# Patient Record
Sex: MALE | URBAN - METROPOLITAN AREA
[De-identification: names, ages, dates, MRNs, and addresses within clinical notes are randomized per-mention and may not be internally consistent; named-entity substitution may affect disease eponyms.]

---

## 2021-04-27 ENCOUNTER — HOME HEALTH ADMISSION (OUTPATIENT)
Dept: HOME HEALTH SERVICES | Facility: HOME HEALTH | Age: 56
End: 2021-04-27

## 2023-11-08 ENCOUNTER — HOSPITAL ENCOUNTER (OUTPATIENT)
Facility: HOSPITAL | Age: 58
Setting detail: RECURRING SERIES
Discharge: HOME | End: 2023-11-11
Payer: MEDICARE

## 2023-11-08 PROCEDURE — 97163 PT EVAL HIGH COMPLEX 45 MIN: CPT

## 2023-11-08 NOTE — PROGRESS NOTES
Is This A New Presentation, Or A Follow-Up?: Skin Lesion
PHYSICAL / OCCUPATIONAL THERAPY - DAILY TREATMENT NOTE (updated )  For Eval visit    Patient Name: Jessica Valencia    Date: 2023    : 1965  Insurance: Payor: Dori Marinelli / Plan: Javier Stacy / Product Type: *No Product type* /      Patient  verified yes     Visit #   Current / Total 1 36   Time   In / Out 11:00 11:40   Pain   In / Out 5 5   Subjective Functional Status/Changes: See POC     TREATMENT AREA =  Pain in right hip [M25.551]    OBJECTIVE           40 min   Eval - untimed                      Therapeutic Procedures: Tx Min Billable or 1:1 Min (if diff from Tx Min) Procedure, Rationale, Specifics     68673 Therapeutic Exercise (timed):  increase ROM, strength, coordination, balance, and proprioception to improve patient's ability to progress to PLOF and address remaining functional goals. (see flow sheet as applicable)     Details if applicable:       49059 Neuromuscular Re-Education (timed):  improve balance, coordination, kinesthetic sense, posture, core stability and proprioception to improve patient's ability to develop conscious control of individual muscles and awareness of position of extremities in order to progress to PLOF and address remaining functional goals. (see flow sheet as applicable)     Details if applicable:       15701 Manual Therapy (timed):  decrease pain, increase ROM, increase tissue extensibility, and decrease trigger points to improve patient's ability to progress to PLOF and address remaining functional goals. The manual therapy interventions were performed at a separate and distinct time from the therapeutic activities interventions .  (see flow sheet as applicable)     Details if applicable:       93967 Self Care/Home Management (timed):  improve patient knowledge and understanding of pain reducing techniques, positioning, posture/ergonomics, home safety, activity modification, diagnosis/prognosis, and physical therapy expectations,
How Severe Is Your Skin Lesion?: mild
Has Your Skin Lesion Been Treated?: been treated
When Was It Treated?: Years ago

## 2023-11-08 NOTE — THERAPY EVALUATION
5/5 L 5/5. Functional Movements: Sit to stand using bilateral UE - 1x to stand; Bridge: 50% limited; Rolling R and L with significant difficulty and SBA; Standing March with contralateral knee buckle, decreased stance time and lack of ROM  Balance: NBOS: 6 sec with LOB  Current deficits include antalgic gait, decreased hip stability and strength, and decreased hip ROM leading to difficulty with all ADLs and functional movements. Pt may show signs of Parkinson's Disease, however difficult to tell due to history of stroke and MARIE; may benefit from neurologic work up. Pt will benefit from a comprehensive POC/HEP to address impairments and restore function in order to return to prior level of function and prevent secondary impairments. Evaluation Complexity:  History:  HIGH Complexity :3+ comorbidities / personal factors will impact the outcome/ POC ; Examination:  HIGH Complexity : 4+ Standardized tests and measures addressing body structure, function, activity limitation and / or participation in recreation  ;Presentation:  HIGH Complexity : Unstable and unpredictable characteristics  ; Clinical Decision Making:  HIGH Complexity : FOTO score of 1- 25  FOTO score = an established functional score where 100 = no disability  Overall Complexity Rating: HIGH   Problem List: pain affecting function, decrease ROM, decrease strength, impaired gait/balance, decrease ADL/functional abilities, decrease activity tolerance, decrease flexibility/joint mobility, and decrease transfer abilities    Treatment Plan may include any combination of the followin Therapeutic Exercise, 57609 Neuromuscular Re-Education, 45906 Manual Therapy, 95321 Therapeutic Activity, 72725 Self Care/Home Management, [id number] Aquatic Therapy, 29766 Gait Training, and (Elective Self Pay) Needle Insertion w/o Injection (1 or 2 muscles), (3+ muscles)  Patient / Family readiness to learn indicated by: asking questions, trying to perform skills,

## 2023-11-13 ENCOUNTER — HOSPITAL ENCOUNTER (OUTPATIENT)
Facility: HOSPITAL | Age: 58
Setting detail: RECURRING SERIES
Discharge: HOME | End: 2023-11-16
Payer: MEDICARE

## 2023-11-13 PROCEDURE — 97112 NEUROMUSCULAR REEDUCATION: CPT

## 2023-11-13 PROCEDURE — 97116 GAIT TRAINING THERAPY: CPT

## 2023-11-13 NOTE — PROGRESS NOTES
PHYSICAL / OCCUPATIONAL THERAPY - DAILY TREATMENT NOTE (updated )    Patient Name: Roly Tracey    Date: 2023    : 1965  Insurance: Payor: Xin Chaudhry / Plan: Loc Schroeder / Product Type: *No Product type* /      Patient  verified yes     Visit #   Current / Total 2 36   Time   In / Out 11:41 12:25   Pain   In / Out 3 3   Subjective Functional Status/Changes: Says his legs are sore from walking up here. TREATMENT AREA =  Pain in right hip [M25.551]    OBJECTIVE    Therapeutic Procedures: Tx Min Billable or 1:1 Min (if diff from Tx Min) Procedure, Rationale, Specifics     77792 Therapeutic Exercise (timed):  increase ROM, strength, coordination, balance, and proprioception to improve patient's ability to progress to PLOF and address remaining functional goals. (see flow sheet as applicable)     Details if applicable:     37  33333 Neuromuscular Re-Education (timed):  improve balance, coordination, kinesthetic sense, posture, core stability and proprioception to improve patient's ability to develop conscious control of individual muscles and awareness of position of extremities in order to progress to PLOF and address remaining functional goals. (see flow sheet as applicable)     Details if applicable:     8  62100 Gait Training (timed):    100 feet with rollator (assistive device) over even surfaces with SBA level of assist. Cuing for stride length and heel toe pattern. To improve safety and dynamic movement with household/community ambulation. (see flow sheet as applicable)     Step taps and marches     79983 Self Care/Home Management (timed):  improve patient knowledge and understanding of pain reducing techniques, positioning, posture/ergonomics, home safety, activity modification, diagnosis/prognosis, and physical therapy expectations, procedures and progression  to improve patient's ability to progress to PLOF and address remaining functional goals.   (see flow

## 2023-11-15 ENCOUNTER — HOSPITAL ENCOUNTER (OUTPATIENT)
Facility: HOSPITAL | Age: 58
Setting detail: RECURRING SERIES
Discharge: HOME | End: 2023-11-18
Payer: MEDICARE

## 2023-11-15 PROCEDURE — 97116 GAIT TRAINING THERAPY: CPT

## 2023-11-15 PROCEDURE — 97112 NEUROMUSCULAR REEDUCATION: CPT

## 2023-11-15 PROCEDURE — 97110 THERAPEUTIC EXERCISES: CPT

## 2023-11-17 ENCOUNTER — HOSPITAL ENCOUNTER (OUTPATIENT)
Facility: HOSPITAL | Age: 58
Setting detail: RECURRING SERIES
Discharge: HOME | End: 2023-11-20
Payer: MEDICARE

## 2023-11-17 PROCEDURE — 97116 GAIT TRAINING THERAPY: CPT

## 2023-11-17 PROCEDURE — 97112 NEUROMUSCULAR REEDUCATION: CPT

## 2023-11-17 PROCEDURE — 97110 THERAPEUTIC EXERCISES: CPT

## 2023-11-17 NOTE — PROGRESS NOTES
patient's ability to progress to PLOF and address remaining functional goals. (see flow sheet as applicable)     Details if applicable:     39  [facility] Totals Reminder: bill using total billable min of TIMED therapeutic procedures (example: do not include dry needle or estim unattended, both untimed codes, in totals to left)  8-22 min = 1 unit; 23-37 min = 2 units; 38-52 min = 3 units; 53-67 min = 4 units; 68-82 min = 5 units   Total Total     [x]  Patient Education billed concurrently with other procedures   [x] Review HEP    [] Progressed/Changed HEP, detail:    [] Other detail:       Objective Information/Functional Measures/Assessment    Trained a lot of gait today and ended up working on [facility] use to assist with confidence. R foot with decreased clearance consistently and decreased stance time on L. Patient will continue to benefit from skilled PT / OT services to modify and progress therapeutic interventions, analyze and address functional mobility deficits, analyze and address ROM deficits, analyze and address strength deficits, analyze and address soft tissue restrictions, analyze and cue for proper movement patterns, analyze and modify for postural abnormalities, and instruct in home and community integration to address functional deficits and attain remaining goals. Progress toward goals / Updated goals:  []  See PN    Compliant with HEP.     PLAN  yes Continue plan of care  [x]  Upgrade activities as tolerated  []  Discharge due to :  []  Other:    Nancy Webb PT    11/17/2023    7:46 AM    Future Appointments   Date Time Provider 4600  46[facility]   11/17/2023 12:20 PM Nancy Webb PT [facility]   11/20/2023 12:20 PM Dorota Bolton [facility]   11/27/2023 11:40 AM Nancy Webb PT [facility]   11/29/2023 12:20 PM Nancy Webb PT [facility]   12/1/2023 11:00 AM Nancy Webb PT [facility]   12/4/2023 11:00 AM Nancy Webb PT [facility]   12/6/2023 10:20 AM Nancy Webb PT [facility]   12/8/2023 11:40

## 2023-11-20 ENCOUNTER — HOSPITAL ENCOUNTER (OUTPATIENT)
Facility: HOSPITAL | Age: 58
Setting detail: RECURRING SERIES
Discharge: HOME | End: 2023-11-23
Payer: MEDICARE

## 2023-11-20 PROCEDURE — 97116 GAIT TRAINING THERAPY: CPT

## 2023-11-20 PROCEDURE — 97530 THERAPEUTIC ACTIVITIES: CPT

## 2023-11-20 PROCEDURE — 97112 NEUROMUSCULAR REEDUCATION: CPT

## 2023-11-20 NOTE — PROGRESS NOTES
PHYSICAL / OCCUPATIONAL THERAPY - DAILY TREATMENT NOTE (updated )    Patient Name: Abhijit Bueno    Date: 2023    : 1965  Insurance: Payor: Berta Kerr / Plan: Mati Parks / Product Type: *No Product type* /      Patient  verified yes     Visit #   Current / Total 5 36   Time   In / Out 1220 PM 1259 PM   Pain   In / Out 3/10 0/10   Subjective Functional Status/Changes: Patient reports R hip still bothers him, especially on the outside. Has been doing his exercises. Patient denies falls or red flags since last visit. TREATMENT AREA =  Pain in right hip [M25.551]    OBJECTIVE    Therapeutic Procedures: Tx Min Billable or 1:1 Min (if diff from Tx Min) Procedure, Rationale, Specifics   10  48694 Therapeutic Activity (timed):  use of dynamic activities replicating functional movements to increase ROM, strength, coordination, balance, and proprioception in order to improve patient's ability to progress to PLOF and address remaining functional goals. (see flow sheet as applicable)    Details if applicable:       10  16696 Neuromuscular Re-Education (timed):  improve balance, coordination, kinesthetic sense, posture, core stability and proprioception to improve patient's ability to develop conscious control of individual muscles and awareness of position of extremities in order to progress to PLOF and address remaining functional goals. (see flow sheet as applicable)     Details if applicable:       19  35126 Gait Training (timed):    200 feet with SPC (assistive device) over even surfaces with CGA x2 level of assist. Cuing for stride length and heel toe pattern. To improve safety and dynamic movement with household/community ambulation.   (see flow sheet as applicable)     Details if applicable:  Ambulated 5J03 ft (down hallway) with cues for foot clearance and decrease shuffling gait       94998 Self Care/Home Management (timed):  improve patient knowledge and understanding

## 2023-11-22 ENCOUNTER — HOSPITAL ENCOUNTER (OUTPATIENT)
Facility: HOSPITAL | Age: 58
Setting detail: RECURRING SERIES
End: 2023-11-22
Payer: MEDICARE

## 2023-11-27 ENCOUNTER — HOSPITAL ENCOUNTER (OUTPATIENT)
Facility: HOSPITAL | Age: 58
Setting detail: RECURRING SERIES
Discharge: HOME | End: 2023-11-30
Payer: MEDICARE

## 2023-11-27 PROCEDURE — 97112 NEUROMUSCULAR REEDUCATION: CPT

## 2023-11-27 PROCEDURE — 97530 THERAPEUTIC ACTIVITIES: CPT

## 2023-11-27 PROCEDURE — 97110 THERAPEUTIC EXERCISES: CPT

## 2023-11-27 NOTE — PROGRESS NOTES
with HEP to progress to meet goals. 2. Pt to report decreased max pain levels less than 5/10 for improvement in ADLs. 11/20/23 max pain 3/10  3. Pt to demonstrate modified tandem for 15sec to improve balance and reciprocal stance.  10 sec 11/27/23      PLAN  yes Continue plan of care  [x]  Upgrade activities as tolerated  []  Discharge due to :  []  Other:    Kushal Wolff PT    11/27/2023    7:40 AM    Future Appointments   Date Time Provider 4600  46[facility]   11/27/2023 11:40 AM uKshal Wolff PT [facility]   11/29/2023 12:20 PM Kushal Wolff PT [facility]   12/1/2023 11:00 AM Kushal Wolff PT [facility]   12/4/2023 11:00 AM Kushal Wolff PT [facility]   12/6/2023 10:20 AM Kushal Wolff PT [facility]   12/8/2023 11:40 AM Kushal Wolff PT [facility]   12/11/2023 11:00 AM Kushal Wolff PT [facility]   12/13/2023 11:00 AM Amina Pilmark, JUNIE [facility]   12/15/2023 11:00 AM Amina Pila, JUNIE [facility]   12/18/2023 11:00 AM Kushal Wolff, PT [facility]   12/20/2023 11:00 AM Amina Pila, JUNIE [facility]   12/22/2023 11:40 AM Kushal Wolff PT [facility]   12/26/2023 11:00 AM Amina Pila, JUNIE [facility]   12/28/2023 11:40 AM Kushal Wolff PT [facility]

## 2023-11-29 ENCOUNTER — HOSPITAL ENCOUNTER (OUTPATIENT)
Facility: HOSPITAL | Age: 58
Setting detail: RECURRING SERIES
Discharge: HOME | End: 2023-12-02
Payer: MEDICARE

## 2023-11-29 PROCEDURE — 97112 NEUROMUSCULAR REEDUCATION: CPT

## 2023-11-29 PROCEDURE — 97530 THERAPEUTIC ACTIVITIES: CPT

## 2023-11-29 PROCEDURE — 97116 GAIT TRAINING THERAPY: CPT

## 2023-11-29 NOTE — PROGRESS NOTES
PHYSICAL / OCCUPATIONAL THERAPY - DAILY TREATMENT NOTE (updated )    Patient Name: Alcira Mcintyre    Date: 2023    : 1965  Insurance: Payor: Mary Ann Books / Plan: Juan A Clam / Product Type: *No Product type* /      Patient  verified yes     Visit #   Current / Total 7 36   Time   In / Out 1100 AM 1145 AM   Pain   In / Out 0/10 0/10   Subjective Functional Status/Changes: Hip is doing OK. I've been practicing not shuffling my feet at home. Patient denies falls or red flags since last visit. TREATMENT AREA =  Pain in right hip [M25.551]    OBJECTIVE    Therapeutic Procedures: Tx Min Billable or 1:1 Min (if diff from Tx Min) Procedure, Rationale, Specifics   20  98857 Therapeutic Activity (timed):  use of dynamic activities replicating functional movements to increase ROM, strength, coordination, balance, and proprioception in order to improve patient's ability to progress to PLOF and address remaining functional goals. (see flow sheet as applicable)    Details if applicable:       10  68545 Neuromuscular Re-Education (timed):  improve balance, coordination, kinesthetic sense, posture, core stability and proprioception to improve patient's ability to develop conscious control of individual muscles and awareness of position of extremities in order to progress to PLOF and address remaining functional goals. (see flow sheet as applicable)     Details if applicable:       15  60812 Gait Training (timed):    200 feet with SPC (assistive device) over even surfaces with CGA x2 level of assist. Cuing for stride length and heel toe pattern. To improve safety and dynamic movement with household/community ambulation. (see flow sheet as applicable)     Details if applicable:  Ambulated around/to-and-from exercises with rollator.  Verbal cues for foot clearance and decrease shuffling gait by cueing for knee to be above rollator's seat as well as cueing patient to listen for audible

## 2023-12-01 ENCOUNTER — HOSPITAL ENCOUNTER (OUTPATIENT)
Facility: HOSPITAL | Age: 58
Setting detail: RECURRING SERIES
End: 2023-12-01
Payer: MEDICARE

## 2023-12-04 ENCOUNTER — APPOINTMENT (OUTPATIENT)
Facility: HOSPITAL | Age: 58
End: 2023-12-04
Payer: MEDICARE

## 2023-12-04 ENCOUNTER — HOSPITAL ENCOUNTER (OUTPATIENT)
Facility: HOSPITAL | Age: 58
Setting detail: RECURRING SERIES
Discharge: HOME | End: 2023-12-07
Payer: MEDICARE

## 2023-12-04 PROCEDURE — 97530 THERAPEUTIC ACTIVITIES: CPT

## 2023-12-04 PROCEDURE — 97116 GAIT TRAINING THERAPY: CPT

## 2023-12-04 PROCEDURE — 97112 NEUROMUSCULAR REEDUCATION: CPT

## 2023-12-04 NOTE — PROGRESS NOTES
PHYSICAL / OCCUPATIONAL THERAPY - DAILY TREATMENT NOTE (updated )    Patient Name: Stacey Alonso    Date: 2023    : 1965  Insurance: Payor: Joe Torre / Plan: Alissa Epstein / Product Type: *No Product type* /      Patient  verified yes     Visit #   Current / Total 8 36   Time   In / Out 1108 AM  1151 AM   Pain   In / Out 0/10 0/10   Subjective Functional Status/Changes: Patient reports he sits all day unless he is going to a doctors appt. TREATMENT AREA =  Pain in right hip [M25.551]    OBJECTIVE    Therapeutic Procedures: Tx Min Billable or 1:1 Min (if diff from Tx Min) Procedure, Rationale, Specifics   20  65202 Therapeutic Activity (timed):  use of dynamic activities replicating functional movements to increase ROM, strength, coordination, balance, and proprioception in order to improve patient's ability to progress to PLOF and address remaining functional goals. (see flow sheet as applicable)    Details if applicable:       13  49205 Neuromuscular Re-Education (timed):  improve balance, coordination, kinesthetic sense, posture, core stability and proprioception to improve patient's ability to develop conscious control of individual muscles and awareness of position of extremities in order to progress to PLOF and address remaining functional goals. (see flow sheet as applicable)     Details if applicable:       10  30087 Gait Training (timed):    200 feet with SPC (assistive device) over even surfaces with CGA x2 level of assist. Cuing for stride length and heel toe pattern. To improve safety and dynamic movement with household/community ambulation. (see flow sheet as applicable)     Details if applicable:  Ambulated for ~200 FT with rollator. Verbal cues for foot clearance and decrease shuffling gait by cueing for knee to be above rollator's seat as well as cueing patient to listen for audible shuffling pattern on floor.          64737 Self Care/Home Management

## 2023-12-06 ENCOUNTER — HOSPITAL ENCOUNTER (OUTPATIENT)
Facility: HOSPITAL | Age: 58
Setting detail: RECURRING SERIES
Discharge: HOME | End: 2023-12-09
Payer: MEDICARE

## 2023-12-06 PROCEDURE — 97530 THERAPEUTIC ACTIVITIES: CPT

## 2023-12-06 PROCEDURE — 97116 GAIT TRAINING THERAPY: CPT

## 2023-12-06 PROCEDURE — 97112 NEUROMUSCULAR REEDUCATION: CPT

## 2023-12-06 NOTE — PROGRESS NOTES
PHYSICAL / OCCUPATIONAL THERAPY - DAILY TREATMENT NOTE (updated )    Patient Name: Yonas Ramsey    Date: 2023    : 1965  Insurance: Payor: Zac Renteria / Plan: Bobby Noriega / Product Type: *No Product type* /      Patient  verified yes     Visit #   Current / Total 9 36   Time   In / Out 10:26  11:04   Pain   In / Out 3/10 0/10   Subjective Functional Status/Changes: Says he is getting up to walk around 2-3x/day when his aid is there. Trying to stay on his feet for about 5 min each time. Also doing the sit to stands and lifting the leg. Hip is sore today. TREATMENT AREA =  Pain in right hip [M25.551]    OBJECTIVE    Therapeutic Procedures: Tx Min Billable or 1:1 Min (if diff from Tx Min) Procedure, Rationale, Specifics   20  04230 Therapeutic Activity (timed):  use of dynamic activities replicating functional movements to increase ROM, strength, coordination, balance, and proprioception in order to improve patient's ability to progress to PLOF and address remaining functional goals. (see flow sheet as applicable)    Details if applicable:       8  52292 Neuromuscular Re-Education (timed):  improve balance, coordination, kinesthetic sense, posture, core stability and proprioception to improve patient's ability to develop conscious control of individual muscles and awareness of position of extremities in order to progress to PLOF and address remaining functional goals. (see flow sheet as applicable)     Details if applicable:       10  05815 Gait Training (timed):    216 feet with rollator (assistive device) over even surfaces with SBA level of assist. Cuing for stride length and heel toe pattern. To improve safety and dynamic movement with household/community ambulation. (see flow sheet as applicable)     Details if applicable:  Ambulated for ~216 FT with rollator.  Verbal cues for foot clearance and decrease shuffling gait by cueing for knee to be above rollator's seat

## 2023-12-08 ENCOUNTER — HOSPITAL ENCOUNTER (OUTPATIENT)
Facility: HOSPITAL | Age: 58
Setting detail: RECURRING SERIES
Discharge: HOME | End: 2023-12-11
Payer: MEDICARE

## 2023-12-08 PROCEDURE — 97530 THERAPEUTIC ACTIVITIES: CPT

## 2023-12-08 PROCEDURE — 97112 NEUROMUSCULAR REEDUCATION: CPT

## 2023-12-08 PROCEDURE — 97116 GAIT TRAINING THERAPY: CPT

## 2023-12-08 NOTE — THERAPY RECERTIFICATION
[facility], [address] [place] [address] [facility] - Phone: [phone]  Fax: [phone] OF CARE/RECERTIFICATION FOR PHYSICAL THERAPY          Patient Name: Azeem Capps : 1965   Treatment/Medical Diagnosis: Pain in right hip [M25.551]   Onset Date: 23    Referral Source: Trung Alvarez MD Start of Care [facility]): 23   Prior Hospitalization: See Medical History Provider #: [id number]   Prior Level of Function: Difficulty walking, but able to walk short distances without rollator   Comorbidities: Stroke 2020, diabetes, HTN, BMI over 30   Visits from [facility]: 10 Missed Visits: 1        Goal/Measure of Progress Goal Met? 1. Independent with HEP to progress to meet goals. Status at last Eval: Goal established Current Status: yes yes   2. Pt to report decreased max pain levels less than 5/10 for improvement in ADLs. Status at last Eval: 710 Current Status: 5/10 yes   3. Pt to demonstrate modified tandem for 15sec to improve balance and reciprocal stance. Status at last Eval: NBOS for 6 sec with LOB Current Status: 15sec with mild to moderate sway yes             Key Functional Changes/Progress: Pt has made some progress in PT for R hip pain. Continues to rely on rollator for ambulation due to decreased bilat foot clearance and decreased balance. Improvement seen with sit to stand strength/mechanics and bed mobility. Improvement seen this date with ability to lift foot while ambulating around clinic. Our focus in skilled PT has mostly been for improvement in gait and balance to decrease fall risk. He responds well to cues audible cues and verbal cues in session for step height and length and is compliant with his HEP which has been updated. Advised him to try to walk more at home when his aid is present to further improve function which he has tried to implement. Would like to continue seeing Mr. Yanez 3x/week to

## 2023-12-08 NOTE — PROGRESS NOTES
PHYSICAL / OCCUPATIONAL THERAPY - DAILY TREATMENT NOTE (updated )    Patient Name: Grant Sanchez    Date: 2023    : 1965  Insurance: Payor: Jj Wellington / Plan: Ilia Lopes / Product Type: *No Product type* /      Patient  verified yes     Visit #   Current / Total 10 36   Time   In / Out 11:57  12:35   Pain   In / Out 3/10 0/10   Subjective Functional Status/Changes: Says he is getting up to walk around 2-3x/day when his aid is there. Trying to stay on his feet for about 5 min each time. Also doing the sit to stands and lifting the leg. Hip is sore today. TREATMENT AREA =  Pain in right hip [M25.551]    OBJECTIVE    Therapeutic Procedures: Tx Min Billable or 1:1 Min (if diff from Tx Min) Procedure, Rationale, Specifics   8  80638 Therapeutic Activity (timed):  use of dynamic activities replicating functional movements to increase ROM, strength, coordination, balance, and proprioception in order to improve patient's ability to progress to PLOF and address remaining functional goals. (see flow sheet as applicable)    Details if applicable:       15  98325 Neuromuscular Re-Education (timed):  improve balance, coordination, kinesthetic sense, posture, core stability and proprioception to improve patient's ability to develop conscious control of individual muscles and awareness of position of extremities in order to progress to PLOF and address remaining functional goals. (see flow sheet as applicable)     Details if applicable:       15  50780 Gait Training (timed):    216 feet with rollator (assistive device) over even surfaces with SBA level of assist. Cuing for stride length and heel toe pattern. To improve safety and dynamic movement with household/community ambulation. (see flow sheet as applicable)     Details if applicable:  Ambulated for ~216 FT with rollator.  Verbal cues for foot clearance and decrease shuffling gait by cueing for knee to be above rollator's seat

## 2023-12-11 ENCOUNTER — HOSPITAL ENCOUNTER (OUTPATIENT)
Facility: HOSPITAL | Age: 58
Setting detail: RECURRING SERIES
Discharge: HOME | End: 2023-12-14
Payer: MEDICARE

## 2023-12-11 PROCEDURE — 97112 NEUROMUSCULAR REEDUCATION: CPT

## 2023-12-11 PROCEDURE — 97530 THERAPEUTIC ACTIVITIES: CPT

## 2023-12-11 NOTE — PROGRESS NOTES
PHYSICAL / OCCUPATIONAL THERAPY - DAILY TREATMENT NOTE (updated )    Patient Name: Yonas Ramsey    Date: 2023    : 1965  Insurance: Payor: Zac Renteria / Plan: Bobby Noriega / Product Type: *No Product type* /      Patient  verified yes     Visit #   Current / Total 11 36   Time   In / Out 11:00  11:48   Pain   In / Out 0/10 010   Subjective Functional Status/Changes: Reports he stayed inside all weekend. Also has a cramp in his R calf that hasn't gone away since this morning. TREATMENT AREA =  Pain in right hip [M25.551]    OBJECTIVE    Therapeutic Procedures: Tx Min Billable or 1:1 Min (if diff from Tx Min) Procedure, Rationale, Specifics   25  12746 Therapeutic Activity (timed):  use of dynamic activities replicating functional movements to increase ROM, strength, coordination, balance, and proprioception in order to improve patient's ability to progress to PLOF and address remaining functional goals. (see flow sheet as applicable)    Details if applicable:       20  63730 Neuromuscular Re-Education (timed):  improve balance, coordination, kinesthetic sense, posture, core stability and proprioception to improve patient's ability to develop conscious control of individual muscles and awareness of position of extremities in order to progress to PLOF and address remaining functional goals. (see flow sheet as applicable)     Details if applicable:       3min  43143 Manual Therapy (timed):  decrease pain to improve patient's ability to progress to PLOF and address remaining functional goals. The manual therapy interventions were performed at a separate and distinct time from the therapeutic activities interventions . (see flow sheet as applicable)     STM along R medial gastroc     [id number] Gait Training (timed):    216 feet with rollator (assistive device) over even surfaces with SBA level of assist. Cuing for stride length and heel toe pattern.   To improve safety and

## 2023-12-13 ENCOUNTER — HOSPITAL ENCOUNTER (OUTPATIENT)
Facility: HOSPITAL | Age: 58
Setting detail: RECURRING SERIES
Discharge: HOME | End: 2023-12-16
Payer: MEDICARE

## 2023-12-13 PROCEDURE — 97530 THERAPEUTIC ACTIVITIES: CPT

## 2023-12-13 PROCEDURE — 97112 NEUROMUSCULAR REEDUCATION: CPT

## 2023-12-13 NOTE — PROGRESS NOTES
facilitate increase R LE activation. Will reassess pt's symptoms NV and resume/modify tx PRN. Discussed utilization of ice to LEFT hip if symptoms persist. Pt acknowledged understanding    Patient will continue to benefit from skilled PT / OT services to modify and progress therapeutic interventions, analyze and address functional mobility deficits, analyze and address ROM deficits, analyze and address strength deficits, analyze and address soft tissue restrictions, analyze and cue for proper movement patterns, analyze and modify for postural abnormalities, and instruct in home and community integration to address functional deficits and attain remaining goals. Progress toward goals / Updated goals:  []  See PN    NEXT PN/RC DUE   1/08/24     # AUTHORIZED VISITS Helena Dancer DATE   17 02/11/24     Goals for this certification period include and are to be achieved in   8  weeks:  Improve FOTO score to 52/100 to show a significant functional change. 2. Pt to report 75% improvement in function to improve QoL. 3. Pt to demonstrate ambulating 20ft with SPC to progress gait at home.  12/13/23 continues to ambulate with rollator    PLAN  yes Continue plan of care  [x]  Upgrade activities as tolerated  []  Discharge due to :  []  Other:    Vania Garland PTA    12/13/2023    12:34 PM    Future Appointments   Date Time Provider [address]   12/15/2023 11:00 AM Vania Garland PTA [facility]   12/18/2023 11:00 AM [facility], PT [facility]   12/20/2023 11:00 AM Vania Garland PTA [facility]   12/22/2023 11:40 AM [facility], PT [facility]   12/26/2023 11:00 AM Vania Garland PTA [facility]   12/28/2023 11:40 AM [facility], PT [facility]

## 2023-12-15 ENCOUNTER — APPOINTMENT (OUTPATIENT)
Facility: HOSPITAL | Age: 58
End: 2023-12-15
Payer: MEDICARE

## 2023-12-22 ENCOUNTER — HOSPITAL ENCOUNTER (OUTPATIENT)
Facility: HOSPITAL | Age: 58
Setting detail: RECURRING SERIES
Discharge: HOME | End: 2023-12-25
Payer: MEDICARE

## 2023-12-22 PROCEDURE — 97116 GAIT TRAINING THERAPY: CPT

## 2023-12-22 PROCEDURE — 97530 THERAPEUTIC ACTIVITIES: CPT

## 2023-12-22 PROCEDURE — 97112 NEUROMUSCULAR REEDUCATION: CPT

## 2023-12-22 NOTE — PROGRESS NOTES
PHYSICAL / OCCUPATIONAL THERAPY - DAILY TREATMENT NOTE (updated )    Patient Name: Alban Broussard    Date: 2023    : 1965  Insurance: Payor: Nicanor Roldan / Plan: Kerri Leavitt / Product Type: *No Product type* /      Patient  verified yes     Visit #   Current / Total 15 36   Time   In / Out 1140 AM 12:20 AM   Pain   In / Out 0/10 0/10   Subjective Functional Status/Changes: Reports he has been getting up more at home and walking around. TREATMENT AREA =  Pain in right hip [M25.551]    OBJECTIVE    Therapeutic Procedures: Tx Min Billable or 1:1 Min (if diff from Tx Min) Procedure, Rationale, Specifics   10  50705 Therapeutic Activity (timed):  use of dynamic activities replicating functional movements to increase ROM, strength, coordination, balance, and proprioception in order to improve patient's ability to progress to PLOF and address remaining functional goals. (see flow sheet as applicable)    Details if applicable:       15  21626 Neuromuscular Re-Education (timed):  improve balance, coordination, kinesthetic sense, posture, core stability and proprioception to improve patient's ability to develop conscious control of individual muscles and awareness of position of extremities in order to progress to PLOF and address remaining functional goals. (see flow sheet as applicable)     Details if applicable:  using SPC in LEFT hand - NBOS hard surface 30\", NBOS on airex 30 sec, wide tandem 2x20\" each; marching drills seated     01812 Manual Therapy (timed):  decrease pain to improve patient's ability to progress to PLOF and address remaining functional goals. The manual therapy interventions were performed at a separate and distinct time from the therapeutic activities interventions .  (see flow sheet as applicable)     STM along R medial gastroc   15  99544 Gait Training (timed):    250 feet with SPC in LEFT hand (assistive device) over even surfaces with CGA level of

## 2023-12-26 ENCOUNTER — HOSPITAL ENCOUNTER (OUTPATIENT)
Facility: HOSPITAL | Age: 58
Setting detail: RECURRING SERIES
End: 2023-12-26
Payer: MEDICARE

## 2023-12-28 ENCOUNTER — HOSPITAL ENCOUNTER (OUTPATIENT)
Facility: HOSPITAL | Age: 58
Setting detail: RECURRING SERIES
Discharge: HOME | End: 2023-12-31
Payer: MEDICARE

## 2023-12-28 ENCOUNTER — APPOINTMENT (OUTPATIENT)
Facility: HOSPITAL | Age: 58
End: 2023-12-28
Payer: MEDICARE

## 2023-12-28 PROCEDURE — 97530 THERAPEUTIC ACTIVITIES: CPT

## 2023-12-28 PROCEDURE — 97112 NEUROMUSCULAR REEDUCATION: CPT

## 2023-12-28 PROCEDURE — 97116 GAIT TRAINING THERAPY: CPT

## 2023-12-28 PROCEDURE — 97110 THERAPEUTIC EXERCISES: CPT

## 2023-12-28 NOTE — PROGRESS NOTES
PHYSICAL / OCCUPATIONAL THERAPY - DAILY TREATMENT NOTE (updated )    Patient Name: Kristopher Yanez    Date: 2023    : 1965  Insurance: Payor: [facility] MEDICARE / Plan: DARRON [employer] / Product Type: *No Product type* /      Patient  verified yes     Visit #   Current / Total 16 36   Time   In / Out 1145 AM 12:40 AM   Pain   In / Out 0/10 0/10   Subjective Functional Status/Changes: Reports he has been getting up more at home and walking around.     TREATMENT AREA =  Pain in right hip [M25.551]    OBJECTIVE    Therapeutic Procedures:  Tx Min Billable or 1:1 Min (if diff from Tx Min) Procedure, Rationale, Specifics   10  36996 Therapeutic Activity (timed):  use of dynamic activities replicating functional movements to increase ROM, strength, coordination, balance, and proprioception in order to improve patient's ability to progress to PLOF and address remaining functional goals.  (see flow sheet as applicable)    Details if applicable:       15  02507 Neuromuscular Re-Education (timed):  improve balance, coordination, kinesthetic sense, posture, core stability and proprioception to improve patient's ability to develop conscious control of individual muscles and awareness of position of extremities in order to progress to PLOF and address remaining functional goals. (see flow sheet as applicable)     Details if applicable:       47282 Manual Therapy (timed):  decrease pain to improve patient's ability to progress to PLOF and address remaining functional goals.  The manual therapy interventions were performed at a separate and distinct time from the therapeutic activities interventions . (see flow sheet as applicable)     STM along R medial gastroc   20  87106 Gait Training (timed):    100 feet with SPC in LEFT hand (assistive device) over even surfaces with CGA level of assist. Cuing for stride length and heel toe pattern.  To improve safety and dynamic movement with household/community

## 2023-12-28 NOTE — THERAPY RECERTIFICATION
BREANNA LOPEZ [facility]  [address] [address] - Phone: [phone]  Fax: [phone]  CONTINUED PLAN OF CARE/RECERTIFICATION FOR PHYSICAL THERAPY          Patient Name: Kristopher Yanez : 1965   Treatment/Medical Diagnosis: Pain in right hip [M25.551]   Onset Date: 23    Referral Source: You Bruner MD Start of Care (SOC): 23   Prior Hospitalization: See Medical History Provider #: [id number]   Prior Level of Function: Difficulty walking, but able to walk short distances without rollator   Comorbidities: Stroke 2020, diabetes, HTN, BMI over 30   Visits from SOC: 15 Missed Visits: 2        Goal/Measure of Progress Goal Met?   1.  Improve FOTO score to 52/100 to show a significant functional change.   Status at last Eval:  Current Status: 41 yes   2.  Pt to report 75% improvement in function to improve QoL.   Status at last Eval:  Current Status: NT na   3.  Pt to demonstrate ambulating 20ft with SPC to progress gait at home.   Status at last Eval: Rollator with short shuffling steps and lack of foot clearance Current Status: Able with gait belt and CGA, takes 5-10min to complete due to short shuffling steps Progressing             Key Functional Changes/Progress: Pt has made good progress in skilled PT for R hip pain, however gait and balance is now the focus. Improvements made over past week with gait as he walks with increased step length and foot clearance for short distances. He is able to ambulate with SPC, gait belt and CGA for 100'. I advised him to continue using rollator for long distances and when his aid is not home. He is working very hard and showing great effort in sessions with sit to stands an other functional activities as seen below objectively with testing. He is able to stand now for 25min while doing standing exercises before needing a rest break. Would like to continue seeing Mr. Yanez 3x/week to

## 2024-01-02 ENCOUNTER — HOSPITAL ENCOUNTER (OUTPATIENT)
Facility: HOSPITAL | Age: 59
Setting detail: RECURRING SERIES
Discharge: HOME | End: 2024-01-05
Payer: MEDICARE

## 2024-01-02 ENCOUNTER — APPOINTMENT (OUTPATIENT)
Facility: HOSPITAL | Age: 59
End: 2024-01-02
Payer: MEDICARE

## 2024-01-02 PROCEDURE — 97112 NEUROMUSCULAR REEDUCATION: CPT

## 2024-01-02 PROCEDURE — 97110 THERAPEUTIC EXERCISES: CPT

## 2024-01-02 PROCEDURE — 97116 GAIT TRAINING THERAPY: CPT

## 2024-01-02 NOTE — PROGRESS NOTES
deficits and attain remaining goals.    Progress toward goals / Updated goals:  []  See PN    NEXT PN/RC DUE   1/08/24     # AUTHORIZED VISITS AUTH EXPIRATION DATE   17 02/11/24     Improved balance with SPC and step taps.    PLAN  yes Continue plan of care  [x]  Upgrade activities as tolerated  []  Discharge due to :  []  Other:    Viviana Campos PT    1/2/2024    9:53 AM    Future Appointments   Date Time Provider Department Center   1/2/2024 12:20 PM Viviana Campos, PT [facility]

## 2024-01-10 ENCOUNTER — TELEPHONE (OUTPATIENT)
Facility: HOSPITAL | Age: 59
End: 2024-01-10

## 2024-01-10 ENCOUNTER — APPOINTMENT (OUTPATIENT)
Facility: HOSPITAL | Age: 59
End: 2024-01-10
Payer: MEDICARE

## 2024-01-10 ENCOUNTER — HOSPITAL ENCOUNTER (OUTPATIENT)
Facility: HOSPITAL | Age: 59
Setting detail: RECURRING SERIES
Discharge: HOME | End: 2024-01-13
Payer: MEDICARE

## 2024-01-10 PROCEDURE — 97112 NEUROMUSCULAR REEDUCATION: CPT

## 2024-01-10 PROCEDURE — 97530 THERAPEUTIC ACTIVITIES: CPT

## 2024-01-10 NOTE — PROGRESS NOTES
and modify for postural abnormalities, and instruct in home and community integration to address functional deficits and attain remaining goals.    Progress toward goals / Updated goals:  []  See PN    NEXT PN/RC DUE   1/08/24     # AUTHORIZED VISITS AUTH EXPIRATION DATE   17 02/11/24     No progress towards goals today.    PLAN  yes Continue plan of care  [x]  Upgrade activities as tolerated  []  Discharge due to :  []  Other:    Viviana Campos PT    1/10/2024    7:38 AM    Future Appointments   Date Time Provider Department Center   1/10/2024 10:20 AM Viviana Campos PT [facility]   1/12/2024 12:20 PM [facility]   1/16/2024  2:20 PM Viviana Campos PT [facility]   1/18/2024 11:00 AM Viviana Campos PT [facility]

## 2024-01-10 NOTE — TELEPHONE ENCOUNTER
spoke to insurance rep about how to get more insurance visits authorized for pt as he has gait instability and is a fall risk and needs intensive therapy for more than the alloted visits he was givien the past 2 times auth was submitted. Rep stated this is a system thing and there is no way to override this request. I explained how inconvenient this is for the pt and myself.

## 2024-01-12 ENCOUNTER — HOSPITAL ENCOUNTER (OUTPATIENT)
Facility: HOSPITAL | Age: 59
Setting detail: RECURRING SERIES
Discharge: HOME | End: 2024-01-15
Payer: MEDICARE

## 2024-01-12 PROCEDURE — 97530 THERAPEUTIC ACTIVITIES: CPT

## 2024-01-12 PROCEDURE — 97112 NEUROMUSCULAR REEDUCATION: CPT

## 2024-01-12 PROCEDURE — 97110 THERAPEUTIC EXERCISES: CPT

## 2024-01-12 NOTE — PROGRESS NOTES
Improved gait post stretches    PLAN  yes Continue plan of care  [x]  Upgrade activities as tolerated  []  Discharge due to :  []  Other:    Viviana Campos PT    1/12/2024    7:42 AM    Future Appointments   Date Time Provider Department Center   1/12/2024 12:20 PM Viviana Campos PT [facility]   1/16/2024  2:20 PM Viviana Campos PT [facility]   1/18/2024 11:00 AM Viviana Campos PT [facility]

## 2024-01-16 ENCOUNTER — APPOINTMENT (OUTPATIENT)
Facility: HOSPITAL | Age: 59
End: 2024-01-16
Payer: MEDICARE

## 2024-01-18 ENCOUNTER — HOSPITAL ENCOUNTER (OUTPATIENT)
Facility: HOSPITAL | Age: 59
Setting detail: RECURRING SERIES
Discharge: HOME | End: 2024-01-21
Payer: MEDICARE

## 2024-01-18 PROCEDURE — 97140 MANUAL THERAPY 1/> REGIONS: CPT

## 2024-01-18 PROCEDURE — 97530 THERAPEUTIC ACTIVITIES: CPT

## 2024-01-18 PROCEDURE — 97110 THERAPEUTIC EXERCISES: CPT

## 2024-01-18 NOTE — PROGRESS NOTES
PN/RC DUE   1/08/24     # AUTHORIZED VISITS AUTH EXPIRATION DATE   17 02/11/24     Improved gait post stretches    PLAN  yes Continue plan of care  [x]  Upgrade activities as tolerated  []  Discharge due to :  []  Other:    Viviana Campos PT    1/18/2024    9:21 AM    Future Appointments   Date Time Provider Department Center   1/18/2024 11:00 AM Viviana Campos PT [facility]   1/19/2024 11:40 AM Viviana Campos PT [facility]

## 2024-01-23 ENCOUNTER — HOSPITAL ENCOUNTER (OUTPATIENT)
Facility: HOSPITAL | Age: 59
Setting detail: RECURRING SERIES
Discharge: HOME | End: 2024-01-26
Payer: MEDICARE

## 2024-01-23 PROCEDURE — 97163 PT EVAL HIGH COMPLEX 45 MIN: CPT

## 2024-01-23 NOTE — PROGRESS NOTES
PHYSICAL / OCCUPATIONAL THERAPY - DAILY TREATMENT NOTE (updated )  For Eval visit    Patient Name: Kristopher Yanez    Date: 2024    : 1965  Insurance: Payor: [employer] / Plan: DARRON [facility] [employer] DUAL / Product Type: *No Product type* /      Patient  verified yes     Visit #   Current / Total 1 36   Time   In / Out 12:30 1:10   Pain   In / Out 0 0   Subjective Functional Status/Changes: See POC     TREATMENT AREA =  Pain in right hip [M25.551]  Balance problems [R26.89]    OBJECTIVE           40 min   Eval - untimed                      Therapeutic Procedures:  Tx Min Billable or 1:1 Min (if diff from Tx Min) Procedure, Rationale, Specifics     14421 Therapeutic Exercise (timed):  increase ROM, strength, coordination, balance, and proprioception to improve patient's ability to progress to PLOF and address remaining functional goals. (see flow sheet as applicable)     Details if applicable:       07277 Neuromuscular Re-Education (timed):  improve balance, coordination, kinesthetic sense, posture, core stability and proprioception to improve patient's ability to develop conscious control of individual muscles and awareness of position of extremities in order to progress to PLOF and address remaining functional goals. (see flow sheet as applicable)     Details if applicable:       18811 Manual Therapy (timed):  decrease pain, increase ROM, increase tissue extensibility, and decrease trigger points to improve patient's ability to progress to PLOF and address remaining functional goals.  The manual therapy interventions were performed at a separate and distinct time from the therapeutic activities interventions . (see flow sheet as applicable)     Details if applicable:       54930 Self Care/Home Management (timed):  improve patient knowledge and understanding of pain reducing techniques, positioning, posture/ergonomics, home safety, activity modification, diagnosis/prognosis, and

## 2024-01-23 NOTE — THERAPY EVALUATION
BREANNA LOPEZ [facility]  [address], [address], [place] [address] Ph:[phone] Fx: [phone]  Plan of Care / Statement of Necessity for Physical Therapy Services     Patient Name: Kristopher Yanez : 1965   Medical   Diagnosis: Pain in right hip [M25.551]  Balance problems [R26.89] Treatment Diagnosis: R26.89  Abnormalities of gait and mobility       Onset Date:      Referral Source: Stephany Pichardo FNP Start of Care (SOC): 2024   Prior Hospitalization: See medical history Provider #: [id number]   Prior Level of Function: Able to walk short distances without rollator   Comorbidities: Stroke , diabetes, HTN, BMI over 30      Assessment / briggs information: Kristopher Yanez is a 58 y.o. male with Dx: balance and gait instability starting after a stroke in ; got worse after being sedentary during covid. Also has hx of R hip replacement in ; was previously able to ambulation around house without SPC or rollator. Now uses rollator all the time and wishes to get back to walking around the house Independently or with the SPC. Had a fall on 23 in the kitchen due to LOB, but has not fallen since then. Reports difficulty with prolonged walking over 2 min, transfers, and stair negotiation. Also reports tightness in bilat LE starting a couple weeks ago that makes it harder to lift his feet per pt report.   Objective:   Gait: rollator with short shuffling steps, decreased clearance on R LE, heavy use of UE.   Strength: Hip Flexion R 2/5 L 3/5, ABD 2/5 bilat, Extension NT due to inability to get in testing position, Knee Flexion R 4/5 L 4/5, Extension R 4/5 L 4/5, Ankle DF R 4/5 L 4/5, PF R 1/5 L 1/5  Mobility: severely limited HS length bilat with 90/90 testing, severely limited trunk ROM in flexion, extension and rotation  Functional Testing: DGI: 14; TUG: with rollator 1:06 and SBA; Peace 22; 5xSTS: from 20 inch height can only complete 3x with

## 2024-01-26 ENCOUNTER — HOSPITAL ENCOUNTER (OUTPATIENT)
Facility: HOSPITAL | Age: 59
Setting detail: RECURRING SERIES
Discharge: HOME | End: 2024-01-29
Payer: MEDICARE

## 2024-01-26 PROCEDURE — 97110 THERAPEUTIC EXERCISES: CPT

## 2024-01-26 PROCEDURE — 97530 THERAPEUTIC ACTIVITIES: CPT

## 2024-01-26 PROCEDURE — 97112 NEUROMUSCULAR REEDUCATION: CPT

## 2024-01-26 NOTE — PROGRESS NOTES
PHYSICAL THERAPY - DAILY TREATMENT NOTE    Patient Name: Kristopher Yanez    Date: 2024    : 1965  Insurance: Payor: [employer] MEDICARE / [facility] / Product Type: *No Product type* /      Patient  verified YES   Visit #   Current / Total 2 36   Time   In / Out 1:40 2:20   Pain   In / Out 3 0   Subjective Functional Status/Changes: \"Just sore today\" \"I got some energy today, but I don't know how much\"     TREATMENT AREA =  Pain in right hip [M25.551]  Balance problems [R26.89]    OBJECTIVE        Therapeutic Procedures:  Tx Min Billable or 1:1 Min (if diff from Tx Min) Procedure, Rationale, Specifics   10  88168 Therapeutic Exercise (timed):  increase ROM, strength, coordination, balance, and proprioception to improve patient's ability to progress to PLOF and address remaining functional goals. (see flow sheet as applicable)     Details if applicable:       20  92625 Neuromuscular Re-Education (timed):  improve balance, coordination, kinesthetic sense, posture, core stability and proprioception to improve patient's ability to develop conscious control of individual muscles and awareness of position of extremities in order to progress to PLOF and address remaining functional goals. (see flow sheet as applicable)     Details if applicable:     10  72468 Therapeutic Activity (timed):  use of dynamic activities replicating functional movements to increase ROM, strength, coordination, balance, and proprioception in order to improve patient's ability to progress to PLOF and address remaining functional goals.  (see flow sheet as applicable)     Details if applicable:            Details if applicable:            Details if applicable:     40  [facility] Totals Reminder: bill using total billable min of TIMED therapeutic procedures (example: do not include dry needle or estim unattended, both untimed codes, in totals to left)  8-22 min = 1 unit; 23-37 min = 2 units; 38-52 min = 3

## 2024-02-02 ENCOUNTER — APPOINTMENT (OUTPATIENT)
Facility: HOSPITAL | Age: 59
End: 2024-02-02
Payer: MEDICARE

## 2024-02-06 ENCOUNTER — APPOINTMENT (OUTPATIENT)
Facility: HOSPITAL | Age: 59
End: 2024-02-06
Payer: MEDICARE

## 2024-02-08 ENCOUNTER — HOSPITAL ENCOUNTER (OUTPATIENT)
Facility: HOSPITAL | Age: 59
Setting detail: RECURRING SERIES
Discharge: HOME | End: 2024-02-11
Payer: MEDICARE

## 2024-02-08 PROCEDURE — 97530 THERAPEUTIC ACTIVITIES: CPT

## 2024-02-08 PROCEDURE — 97116 GAIT TRAINING THERAPY: CPT

## 2024-02-08 PROCEDURE — 97112 NEUROMUSCULAR REEDUCATION: CPT

## 2024-02-08 NOTE — PROGRESS NOTES
PHYSICAL THERAPY - DAILY TREATMENT NOTE    Patient Name: Kristopher Yanez    Date: 2024    : 1965  Insurance: Payor: [facility] MEDICARE / Plan: [employer] DUAL COMPLETE / Product Type: *No Product type* /      Patient  verified YES   Visit #   Current / Total 3 36   Time   In / Out 138  PM   Pain   In / Out 0/10 0/10   Subjective Functional Status/Changes: Patient reports not being here for a little bit so he's stiff. States he has been walking every day since LV except when it's been really cold.   Caretaker (Byron) present today     TREATMENT AREA =  Pain in right hip [M25.551]  Balance problems [R26.89]    OBJECTIVE         Therapeutic Procedures:  Tx Min Billable or 1:1 Min (if diff from Tx Min) Procedure, Rationale, Specifics   Total  41 Total   [facility] Totals Reminder: bill using total billable min of TIMED therapeutic procedures (example: do not include dry needle or estim unattended, both untimed codes, in totals to left)  8-22 min = 1 unit; 23-37 min = 2 units; 38-52 min = 3 units; 53-67 min = 4 units; 68-82 min = 5 units   10  66750 Therapeutic Activity (timed):  use of dynamic activities replicating functional movements to increase ROM, strength, coordination, balance, and proprioception in order to improve patient's ability to progress to PLOF and address remaining functional goals.  (see flow sheet as applicable)    Details if applicable:      21  49050 Neuromuscular Re-Education (timed):  improve balance, coordination, kinesthetic sense, posture, core stability and proprioception to improve patient's ability to develop conscious control of individual muscles and awareness of position of extremities in order to progress to PLOF and address remaining functional goals. (see flow sheet as applicable)    Details if applicable:    [facility]   DGI    10  87293 Gait Training (timed):    8 feet with spc (assistive device) over LEVELED surfaces with SBA/CGA level of assist. Cuing for safe

## 2024-02-14 ENCOUNTER — HOSPITAL ENCOUNTER (OUTPATIENT)
Facility: HOSPITAL | Age: 59
Setting detail: RECURRING SERIES
Discharge: HOME | End: 2024-02-17
Payer: MEDICARE

## 2024-02-14 PROCEDURE — 97116 GAIT TRAINING THERAPY: CPT

## 2024-02-14 PROCEDURE — 97530 THERAPEUTIC ACTIVITIES: CPT

## 2024-02-14 PROCEDURE — 97112 NEUROMUSCULAR REEDUCATION: CPT

## 2024-02-14 NOTE — PROGRESS NOTES
PHYSICAL THERAPY - DAILY TREATMENT NOTE    Patient Name: Kristopher Yanez    Date: 2024    : 1965  Insurance: Payor: [facility] MEDICARE / Plan: [employer] DUAL COMPLETE / Product Type: *No Product type* /      Patient  verified YES   Visit #   Current / Total 4 36   Time   In / Out 1100 AM 1145 AM   Pain   In / Out 0/10 0/10   Subjective Functional Status/Changes: Patient reports having a higher blood sugar this AM around 340, currently 390. Pt states he forgot to take his insulin last night. Patient reports he fell trying to get on to the bed on Saturday. States he sat on the side of the bed and slid off. States he forgot to reach behind him.      TREATMENT AREA =  Pain in right hip [M25.551]  Balance problems [R26.89]    OBJECTIVE         Therapeutic Procedures:  Tx Min Billable or 1:1 Min (if diff from Tx Min) Procedure, Rationale, Specifics   Total  45 Total   [facility] Totals Reminder: bill using total billable min of TIMED therapeutic procedures (example: do not include dry needle or estim unattended, both untimed codes, in totals to left)  8-22 min = 1 unit; 23-37 min = 2 units; 38-52 min = 3 units; 53-67 min = 4 units; 68-82 min = 5 units   15  94632 Therapeutic Activity (timed):  use of dynamic activities replicating functional movements to increase ROM, strength, coordination, balance, and proprioception in order to improve patient's ability to progress to PLOF and address remaining functional goals.  (see flow sheet as applicable)    Details if applicable:      15  14985 Neuromuscular Re-Education (timed):  improve balance, coordination, kinesthetic sense, posture, core stability and proprioception to improve patient's ability to develop conscious control of individual muscles and awareness of position of extremities in order to progress to PLOF and address remaining functional goals. (see flow sheet as applicable)    Details if applicable:    Initial eval: WOODWARD ; DGI    15  58278 Gait

## 2024-02-16 ENCOUNTER — APPOINTMENT (OUTPATIENT)
Facility: HOSPITAL | Age: 59
End: 2024-02-16
Payer: MEDICARE

## 2024-03-06 ENCOUNTER — TELEPHONE (OUTPATIENT)
Facility: HOSPITAL | Age: 59
End: 2024-03-06

## 2024-03-06 NOTE — TELEPHONE ENCOUNTER
Spoke to pt about 3rd NS today at 1020 AM with me. Pt stated that his ride never came and was waiting outside. discussed CX/NS policy, and if he NS/CX <24 hrs for any future appt, he may be DC'd. Confirmed next f/u appt on 03/08/24 at 1020 AM with me.

## 2024-03-11 ENCOUNTER — TELEPHONE (OUTPATIENT)
Facility: HOSPITAL | Age: 59
End: 2024-03-11

## 2024-03-11 NOTE — TELEPHONE ENCOUNTER
LVM stating patient has been DC'd from physical therapy due to noncompliance with attendance policy (4 no shows/no calls).

## 2024-03-11 NOTE — TELEPHONE ENCOUNTER
patient called to verify his 3/11 appt & was told that it was at 10:20. Patient asked for a reminder call & was advised we may not be able to do that but patient is signed up for text reminders

## 2024-05-29 ENCOUNTER — HOSPITAL ENCOUNTER (OUTPATIENT)
Facility: HOSPITAL | Age: 59
Setting detail: RECURRING SERIES
Discharge: HOME | End: 2024-06-01
Payer: MEDICARE

## 2024-05-29 PROCEDURE — 97163 PT EVAL HIGH COMPLEX 45 MIN: CPT

## 2024-05-29 NOTE — THERAPY EVALUATION
BREANNA LOPEZ [facility] - [facility]  [address], [address], [place] [address] Ph:[phone] Fx: [phone]  Plan of Care / Statement of Necessity for Physical Therapy Services     Patient Name: Kristopher Yanez : 1965   Medical   Diagnosis: Balance problems [R26.89] Treatment Diagnosis: R26.81  Unsteadiness on feet       Onset Date: chronic     Referral Source: Stephany Pichardo FNP Start of Care (SOC): 2024   Prior Hospitalization: See medical history Provider #: [id number]   Prior Level of Function: Chronic balance issues with gait in house and community   Comorbidities: Diabetic neuropathy, alcohol use, Diabetes, Heart disease, HTN, Stroke     Assessment / key information: Kristopher aYnez is a 58 y.o. male with Dx: balance problems starting many years ago. Underwent R MARIE last year which he was treated for here. Walking with a rollator in the house from the living room to the kitchen and to the bed room, but uses WC for community ambulation. Denies falls recently but is fearful of falling. Complains of fatigue in the legs after 2-3 min of standing, trouble with transfers, bed mobility, and all ADLs. Has Aid with him 5 days a week from 9:30-2:30pm. Reports no pain, just stiffness in the hips and the low back. Would like to get back to walking outside for short bursts and walking in the house consistently with the rollator.   Objective:   Gait: uses WC to come back to eval room - 2WW with decreased step clearance, decreased step length bilat and fwd trunk.   Lumbar ROM: Flexion NT due to balance, Extension to neutral, SB NT, Rot 50% Limited bilat.   Hip ROM: Flexion R 96 L 108, Extension R lacking 18 deg L lacking 12 to neutral, IR R 20 L 18, ER R 16 L 32  Strength: Hip Flexion R 4-/5 L 4/5, ABD R 2/5 L 2+/5, Extension R 2/5 L 2+/5, Knee Flexion R 4+/5 L 4/5, Extension R 4-/5 L 4+/5.   Balance Testing: NBOS: EO 18 sec, EC 7 sec  HS 90/90: R 59 deg, L 47

## 2024-05-29 NOTE — PROGRESS NOTES
PHYSICAL / OCCUPATIONAL THERAPY - DAILY TREATMENT NOTE (updated )  For Eval visit    Patient Name: Kristopher Yanez    Date: 2024    : 1965  Insurance: Payor: [facility] MEDICARE / Plan: [employer] DUAL COMPLETE / Product Type: *No Product type* /      Patient  verified yes     Visit #   Current / Total 1 24   Time   In / Out 11:00 11:45   Pain   In / Out 0 0   Subjective Functional Status/Changes: See POC     TREATMENT AREA =  Balance problems [R26.89]    OBJECTIVE           45 min   Eval - untimed                      Therapeutic Procedures:  Tx Min Billable or 1:1 Min (if diff from Tx Min) Procedure, Rationale, Specifics     68826 Therapeutic Exercise (timed):  increase ROM, strength, coordination, balance, and proprioception to improve patient's ability to progress to PLOF and address remaining functional goals. (see flow sheet as applicable)     Details if applicable:       52146 Neuromuscular Re-Education (timed):  improve balance, coordination, kinesthetic sense, posture, core stability and proprioception to improve patient's ability to develop conscious control of individual muscles and awareness of position of extremities in order to progress to PLOF and address remaining functional goals. (see flow sheet as applicable)     Details if applicable:       24739 Manual Therapy (timed):  decrease pain, increase ROM, increase tissue extensibility, and decrease trigger points to improve patient's ability to progress to PLOF and address remaining functional goals.  The manual therapy interventions were performed at a separate and distinct time from the therapeutic activities interventions . (see flow sheet as applicable)     Details if applicable:       49169 Self Care/Home Management (timed):  improve patient knowledge and understanding of pain reducing techniques, positioning, posture/ergonomics, home safety, activity modification, diagnosis/prognosis, and physical therapy expectations, procedures

## 2024-05-31 ENCOUNTER — HOSPITAL ENCOUNTER (OUTPATIENT)
Facility: HOSPITAL | Age: 59
Setting detail: RECURRING SERIES
End: 2024-05-31
Payer: MEDICARE

## 2024-05-31 PROCEDURE — 97530 THERAPEUTIC ACTIVITIES: CPT

## 2024-05-31 PROCEDURE — 97110 THERAPEUTIC EXERCISES: CPT

## 2024-05-31 NOTE — PROGRESS NOTES
PHYSICAL / OCCUPATIONAL THERAPY - DAILY TREATMENT NOTE (updated )    Patient Name: Kristopher Yanez    Date: 2024    : 1965  Insurance: Payor: [facility] MEDICARE / Plan: [employer] DUAL COMPLETE / Product Type: *No Product type* /      Patient  verified yes     Visit #   Current / Total 2 24   Time   In / Out 1145 AM 1214 PM   Pain   In / Out 0/10 0/10   Subjective Functional Status/Changes: Pt reports wanting to start walking outside again and get on the bike in the clinic.     TREATMENT AREA =  Balance problems [R26.89]      OBJECTIVE    Therapeutic Procedures:  Tx Min Billable or 1:1 Min (if diff from Tx Min) Procedure, Rationale, Specifics   Total  29 Total   [facility] Totals Reminder: bill using total billable min of TIMED therapeutic procedures (example: do not include dry needle or estim unattended, both untimed codes, in totals to left)  8-22 min = 1 unit; 23-37 min = 2 units; 38-52 min = 3 units; 53-67 min = 4 units; 68-82 min = 5 units   5  04069 Neuromuscular Re-Education (timed):  improve balance, coordination, kinesthetic sense, posture, core stability and proprioception to improve patient's ability to develop conscious control of individual muscles and awareness of position of extremities in order to progress to PLOF and address remaining functional goals. (see flow sheet as applicable)    Details if applicable     10  17023 Therapeutic Exercise (timed):  increase ROM, strength, coordination, balance, and proprioception to improve patient's ability to progress to PLOF and address remaining functional goals. (see flow sheet as applicable)    Details if applicable:       14  60124 Therapeutic Activity (timed):  use of dynamic activities replicating functional movements to increase ROM, strength, coordination, balance, and proprioception in order to improve patient's ability to progress to PLOF and address remaining functional goals.  (see flow sheet as applicable)    Details if applicable:

## 2024-06-05 ENCOUNTER — HOSPITAL ENCOUNTER (OUTPATIENT)
Facility: HOSPITAL | Age: 59
Setting detail: RECURRING SERIES
Discharge: HOME | End: 2024-06-08
Payer: MEDICARE

## 2024-06-05 PROCEDURE — 97112 NEUROMUSCULAR REEDUCATION: CPT

## 2024-06-05 PROCEDURE — 97110 THERAPEUTIC EXERCISES: CPT

## 2024-06-05 PROCEDURE — 97530 THERAPEUTIC ACTIVITIES: CPT

## 2024-06-05 NOTE — PROGRESS NOTES
PHYSICAL / OCCUPATIONAL THERAPY - DAILY TREATMENT NOTE (updated )    Patient Name: Kristopher Yanez    Date: 2024    : 1965  Insurance: Payor: [facility] MEDICARE / Plan: [employer] DUAL COMPLETE / Product Type: *No Product type* /      Patient  verified yes     Visit #   Current / Total 3 24   Time   In / Out 1140 AM 1222 PM   Pain   In / Out 0/10 0/10   Subjective Functional Status/Changes: Pt reports going for a walk yesterday from his front door and down the driveway. Patient denies falls or red flags since last visit.     TREATMENT AREA =  Balance problems [R26.89]      OBJECTIVE    Therapeutic Procedures:  Tx Min Billable or 1:1 Min (if diff from Tx Min) Procedure, Rationale, Specifics   Total  42 Total   [facility] Totals Reminder: bill using total billable min of TIMED therapeutic procedures (example: do not include dry needle or estim unattended, both untimed codes, in totals to left)  8-22 min = 1 unit; 23-37 min = 2 units; 38-52 min = 3 units; 53-67 min = 4 units; 68-82 min = 5 units   20  11609 Neuromuscular Re-Education (timed):  improve balance, coordination, kinesthetic sense, posture, core stability and proprioception to improve patient's ability to develop conscious control of individual muscles and awareness of position of extremities in order to progress to PLOF and address remaining functional goals. (see flow sheet as applicable)    Details if applicable     10  45905 Therapeutic Exercise (timed):  increase ROM, strength, coordination, balance, and proprioception to improve patient's ability to progress to PLOF and address remaining functional goals. (see flow sheet as applicable)    Details if applicable:       12  74987 Therapeutic Activity (timed):  use of dynamic activities replicating functional movements to increase ROM, strength, coordination, balance, and proprioception in order to improve patient's ability to progress to PLOF and address remaining functional goals.  (see flow

## 2024-06-07 ENCOUNTER — APPOINTMENT (OUTPATIENT)
Facility: HOSPITAL | Age: 59
End: 2024-06-07
Payer: MEDICARE

## 2024-06-12 ENCOUNTER — HOSPITAL ENCOUNTER (OUTPATIENT)
Facility: HOSPITAL | Age: 59
Setting detail: RECURRING SERIES
Discharge: HOME | End: 2024-06-15
Payer: MEDICARE

## 2024-06-12 PROCEDURE — 97530 THERAPEUTIC ACTIVITIES: CPT

## 2024-06-12 PROCEDURE — 97116 GAIT TRAINING THERAPY: CPT

## 2024-06-12 PROCEDURE — 97112 NEUROMUSCULAR REEDUCATION: CPT

## 2024-06-12 PROCEDURE — 97110 THERAPEUTIC EXERCISES: CPT

## 2024-06-12 NOTE — PROGRESS NOTES
exercises a minimum of 3x/day to continue to progress towards PT goals. However pt could also note decrease activity tolerance and increase stiffness due to not being able to attend PT for 1 week. Nonetheless, pt has been heavily educated on compliance with HEP and encouraged him to continue his walking around the house. Pt acknowledged understanding.      Patient will continue to benefit from skilled PT / OT services to modify and progress therapeutic interventions, analyze and address functional mobility deficits, analyze and address ROM deficits, analyze and address strength deficits, analyze and address soft tissue restrictions, analyze and cue for proper movement patterns, analyze and modify for postural abnormalities, analyze and address imbalance/dizziness, and instruct in home and community integration to address functional deficits and attain remaining goals.    Progress toward goals / Updated goals:  []  See Progress Note/Recertification    NEXT PN DUE RECERT DATE   06/29/24 07/29/24     # AUTH VISITS AUTH EXPIRATION DATE   JOHNATHAN 12/31/24       Short Term Goals: To be accomplished in 4 weeks  Independent with HEP to progress to meet goals. 06/12/24 issued updated HEP for 3x/day  2. Pt to demonstrate independent standing tolerance for 4 min for improvement in ADLs. 06/05/24 able to do this today  3. Pt to demonstrate 5 min of walking with 2WW to improve endurance and household ambulation.     Long Term Goals: To be accomplished in 8 weeks  Improve functional outcome measure score by 10 to show a significant functional change.  2. Pt to demonstrate sit to stand from 20 inches x10 without UE use to improve LE strength and transfers.  3. Pt to report walking outside with rollator for 5 min to improve mental and physical health.    PLAN  - Continue Plan of Care  - Upgrade activities as tolerated    Katarzyna Pierce, PTA    6/12/2024    8:45 AM    Future Appointments   Date Time Provider Department Center   6/12/2024

## 2024-06-14 ENCOUNTER — HOSPITAL ENCOUNTER (OUTPATIENT)
Facility: HOSPITAL | Age: 59
Setting detail: RECURRING SERIES
End: 2024-06-14
Payer: MEDICARE

## 2024-06-18 ENCOUNTER — HOSPITAL ENCOUNTER (OUTPATIENT)
Facility: HOSPITAL | Age: 59
Setting detail: RECURRING SERIES
End: 2024-06-18
Payer: MEDICARE

## 2024-06-25 ENCOUNTER — TELEPHONE (OUTPATIENT)
Facility: HOSPITAL | Age: 59
End: 2024-06-25

## 2024-06-25 NOTE — TELEPHONE ENCOUNTER
Spoke to pt about NS/no call appt today. Pt reported his aid currently does not have a car so he has no means of transportation. Reminded pt of Th (6/27) appt at 1220. WCB to confirm if he can make it.

## 2024-06-27 ENCOUNTER — APPOINTMENT (OUTPATIENT)
Facility: HOSPITAL | Age: 59
End: 2024-06-27
Payer: MEDICARE